# Patient Record
Sex: MALE | Race: WHITE
[De-identification: names, ages, dates, MRNs, and addresses within clinical notes are randomized per-mention and may not be internally consistent; named-entity substitution may affect disease eponyms.]

---

## 2019-06-27 ENCOUNTER — HOSPITAL ENCOUNTER (OUTPATIENT)
Dept: HOSPITAL 47 - EC | Age: 19
Setting detail: OBSERVATION
LOS: 2 days | Discharge: HOME | End: 2019-06-29
Payer: COMMERCIAL

## 2019-06-27 DIAGNOSIS — S22.079A: ICD-10-CM

## 2019-06-27 DIAGNOSIS — S22.42XA: ICD-10-CM
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
## 2019-06-27 DIAGNOSIS — M79.652: ICD-10-CM

## 2019-06-27 DIAGNOSIS — S22.069A: Primary | ICD-10-CM

## 2019-06-27 DIAGNOSIS — R53.81: ICD-10-CM

## 2019-06-27 DIAGNOSIS — S27.321A: ICD-10-CM
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
## 2019-06-27 DIAGNOSIS — M25.512: ICD-10-CM
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
## 2019-06-27 LAB
ALBUMIN SERPL-MCNC: 4.6 G/DL (ref 3.5–5)
ALP SERPL-CCNC: 79 U/L (ref 38–126)
ALT SERPL-CCNC: 37 U/L (ref 21–72)
ANION GAP SERPL CALC-SCNC: 11 MMOL/L
APTT BLD: 22.1 SEC (ref 22–30)
AST SERPL-CCNC: 49 U/L (ref 17–59)
BASOPHILS # BLD AUTO: 0 K/UL (ref 0–0.2)
BASOPHILS NFR BLD AUTO: 0 %
BUN SERPL-SCNC: 24 MG/DL (ref 9–20)
CALCIUM SPEC-MCNC: 9.7 MG/DL (ref 8.4–10.2)
CHLORIDE SERPL-SCNC: 103 MMOL/L (ref 98–107)
CO2 SERPL-SCNC: 26 MMOL/L (ref 22–30)
EOSINOPHIL # BLD AUTO: 0.1 K/UL (ref 0–0.7)
EOSINOPHIL NFR BLD AUTO: 2 %
ERYTHROCYTE [DISTWIDTH] IN BLOOD BY AUTOMATED COUNT: 5.26 M/UL (ref 4.3–5.9)
ERYTHROCYTE [DISTWIDTH] IN BLOOD: 14.1 % (ref 11.5–15.5)
GLUCOSE SERPL-MCNC: 113 MG/DL (ref 74–99)
HCT VFR BLD AUTO: 43.6 % (ref 39–53)
HGB BLD-MCNC: 14.5 GM/DL (ref 13–17.5)
INR PPP: 0.9 (ref ?–1.2)
LYMPHOCYTES # SPEC AUTO: 1.7 K/UL (ref 1–4.8)
LYMPHOCYTES NFR SPEC AUTO: 22 %
MCH RBC QN AUTO: 27.7 PG (ref 25–35)
MCHC RBC AUTO-ENTMCNC: 33.4 G/DL (ref 31–37)
MCV RBC AUTO: 82.9 FL (ref 80–100)
MONOCYTES # BLD AUTO: 0.3 K/UL (ref 0–1)
MONOCYTES NFR BLD AUTO: 4 %
NEUTROPHILS # BLD AUTO: 5.5 K/UL (ref 1.3–7.7)
NEUTROPHILS NFR BLD AUTO: 71 %
PH UR: 6 [PH] (ref 5–8)
PLATELET # BLD AUTO: 240 K/UL (ref 150–450)
POTASSIUM SERPL-SCNC: 4.4 MMOL/L (ref 3.5–5.1)
PROT SERPL-MCNC: 7.4 G/DL (ref 6.3–8.2)
PT BLD: 10.2 SEC (ref 9–12)
SODIUM SERPL-SCNC: 140 MMOL/L (ref 137–145)
SP GR UR: >1.05 (ref 1–1.03)
UROBILINOGEN UR QL STRIP: <2 MG/DL (ref ?–2)
WBC # BLD AUTO: 7.8 K/UL (ref 4–11)

## 2019-06-27 PROCEDURE — 70450 CT HEAD/BRAIN W/O DYE: CPT

## 2019-06-27 PROCEDURE — 84484 ASSAY OF TROPONIN QUANT: CPT

## 2019-06-27 PROCEDURE — 71260 CT THORAX DX C+: CPT

## 2019-06-27 PROCEDURE — 97535 SELF CARE MNGMENT TRAINING: CPT

## 2019-06-27 PROCEDURE — 96374 THER/PROPH/DIAG INJ IV PUSH: CPT

## 2019-06-27 PROCEDURE — 85610 PROTHROMBIN TIME: CPT

## 2019-06-27 PROCEDURE — 86850 RBC ANTIBODY SCREEN: CPT

## 2019-06-27 PROCEDURE — 74177 CT ABD & PELVIS W/CONTRAST: CPT

## 2019-06-27 PROCEDURE — 80053 COMPREHEN METABOLIC PANEL: CPT

## 2019-06-27 PROCEDURE — 96376 TX/PRO/DX INJ SAME DRUG ADON: CPT

## 2019-06-27 PROCEDURE — 72125 CT NECK SPINE W/O DYE: CPT

## 2019-06-27 PROCEDURE — 97116 GAIT TRAINING THERAPY: CPT

## 2019-06-27 PROCEDURE — 72170 X-RAY EXAM OF PELVIS: CPT

## 2019-06-27 PROCEDURE — 71045 X-RAY EXAM CHEST 1 VIEW: CPT

## 2019-06-27 PROCEDURE — 86901 BLOOD TYPING SEROLOGIC RH(D): CPT

## 2019-06-27 PROCEDURE — 36415 COLL VENOUS BLD VENIPUNCTURE: CPT

## 2019-06-27 PROCEDURE — 86900 BLOOD TYPING SEROLOGIC ABO: CPT

## 2019-06-27 PROCEDURE — 97162 PT EVAL MOD COMPLEX 30 MIN: CPT

## 2019-06-27 PROCEDURE — 85025 COMPLETE CBC W/AUTO DIFF WBC: CPT

## 2019-06-27 PROCEDURE — 94762 N-INVAS EAR/PLS OXIMTRY CONT: CPT

## 2019-06-27 PROCEDURE — 85730 THROMBOPLASTIN TIME PARTIAL: CPT

## 2019-06-27 PROCEDURE — 99285 EMERGENCY DEPT VISIT HI MDM: CPT

## 2019-06-27 PROCEDURE — 97166 OT EVAL MOD COMPLEX 45 MIN: CPT

## 2019-06-27 PROCEDURE — 73562 X-RAY EXAM OF KNEE 3: CPT

## 2019-06-27 PROCEDURE — 73030 X-RAY EXAM OF SHOULDER: CPT

## 2019-06-27 PROCEDURE — 96375 TX/PRO/DX INJ NEW DRUG ADDON: CPT

## 2019-06-27 PROCEDURE — 73552 X-RAY EXAM OF FEMUR 2/>: CPT

## 2019-06-27 PROCEDURE — 81003 URINALYSIS AUTO W/O SCOPE: CPT

## 2019-06-27 RX ADMIN — IBUPROFEN PRN MG: 400 TABLET, FILM COATED ORAL at 21:09

## 2019-06-27 NOTE — CT
EXAMINATION TYPE: CT ChestAbdPelvis w con

 

DATE OF EXAM: 6/27/2019

 

COMPARISON: None

 

HISTORY: 18-year-old male with pain after Trauma-8 to 10 foot Fall

 

TECHNIQUE: Contiguous axial scanning of the test, abdomen, and pelvis performed with IV Contrast, pat
ient injected with 100 ml mL of Isovue 300. Delayed images through the kidneys were obtained. Coronal
/sagittal reconstructions performed.

 

CT DLP: 1194.70 mGycm

Automated exposure control for dose reduction was used.

 

FINDINGS: 

Chest:

Heart normal size without pericardial effusion. Mild residual thymic tissue is present in the anterio
r mediastinum.

 

Aorta normal caliber with conventional or 2 subtle branching anatomy. No evidence for aortic dissecti
on.

 

No thoracic lymphadenopathy. Mild bilateral gynecomastia.

 

Scattered mild subpleural pulmonary groundglass is present in the left lung without pleural effusion.
 There is trace punctate foci of pleural air adjacent to the left posterior eighth rib.

 

 

ABDOMEN:

No focal liver lesion or biliary ductal dilatation. Portal venous system is patent. Prominent ingeste
d debris within the stomach.

 

Gallbladder, adrenal glands, kidneys, spleen, and pancreas appear within normal limits.

 

No dilated small bowel, free fluid, or free air.

 

No mesenteric or retroperitoneal lymphadenopathy seen.

 

Mild to moderate stool burden. No pericolonic inflammatory change.

 

 

Pelvis:

Bladder is urine distended. No abnormal fluid collection in the pelvis or pelvic lymphadenopathy.

 

 

Bones:

No hip, pelvic, or sacral fracture is identified. Vertebral body heights are maintained.

 

However, there are fractures of the left lateral seventh and left posterior eighth, ninth, and 10th r
ibs without any displacement. Accompanying fractures involving the left T8, T9, and T10 transverse pr
ocesses.

 

 

 

 

IMPRESSION: 

 

1. NONDISPLACED FRACTURES OF THE LEFT SEVENTH, EIGHTH, NINTH, AND 10TH RIBS WITH ASSOCIATED MILD PULM
ONARY CONTUSIONS ON THE LEFT.

2. A COUPLE PUNCTATE FOCI OF PLEURAL AIR ADJACENT TO THE LEFT POSTERIOR EIGHTH RIB FRACTURE. NO SIZAB
LE PNEUMOTHORAX AT THIS TIME. THE PATIENT CAN BE MONITORED.

3. THERE ARE ACCOMPANYING FRACTURES OF THE LEFT T8, T9, AND T10 TRANSVERSE PROCESSES.

## 2019-06-27 NOTE — XR
EXAMINATION TYPE: XR shoulder complete LT

 

DATE OF EXAM: 6/27/2019

 

COMPARISON: NONE

 

HISTORY: 18-year-old male with pain after fall

 

TECHNIQUE: 3 views

 

FINDINGS: 

AC joint appears congruent and intact. No acute fracture, subluxation, or dislocation.

 

IMPRESSION: 

No acute osseous abnormality seen.

## 2019-06-27 NOTE — P.GSHP
History of Present Illness


H&P Date: 06/27/19





Patient presents status post fall over 8-10 feet off the ship.  He complains 

primarily of left back pain including rib pain.  He has multiple rib fractures 

on the left side.  Ironically, pain is well-controlled.  Recommend orthopedic 

spine consultation for fracture transverse process.  Continue with close 

observation.  Anticipated discharge pending orthopedic assessment.





Past Medical History


Past Medical History: No Reported History


History of Any Multi-Drug Resistant Organisms: None Reported


Past Surgical History: No Surgical Hx Reported


Past Anesthesia/Blood Transfusion Reactions: No Reported Reaction


Past Psychological History: No Psychological Hx Reported


Smoking Status: Never smoker


Past Alcohol Use History: None Reported


Past Drug Use History: None Reported





Medications and Allergies


                                Home Medications











 Medication  Instructions  Recorded  Confirmed  Type


 


No Known Home Medications  06/27/19 06/27/19 History








                                    Allergies











Allergy/AdvReac Type Severity Reaction Status Date / Time


 


No Known Allergies Allergy   Verified 06/27/19 14:01














Surgical - Exam


                                   Vital Signs











Temp Pulse Resp BP Pulse Ox


 


 97.0 F L  89   20   142/82   98 


 


 06/27/19 12:43  06/27/19 12:43  06/27/19 12:43  06/27/19 12:43  06/27/19 12:43














Results





- Labs





                                 06/27/19 12:21





                                 06/27/19 12:21


                  Abnormal Lab Results - Last 24 Hours (Table)











  06/27/19 Range/Units





  12:21 


 


BUN  24 H  (9-20)  mg/dL


 


Glucose  113 H  (74-99)  mg/dL








                                 Diabetes panel











  06/27/19 Range/Units





  12:21 


 


Sodium  140  (137-145)  mmol/L


 


Potassium  4.4  (3.5-5.1)  mmol/L


 


Chloride  103  ()  mmol/L


 


Carbon Dioxide  26  (22-30)  mmol/L


 


BUN  24 H  (9-20)  mg/dL


 


Creatinine  1.06  (0.66-1.25)  mg/dL


 


Glucose  113 H  (74-99)  mg/dL


 


Calcium  9.7  (8.4-10.2)  mg/dL


 


AST  49  (17-59)  U/L


 


ALT  37  (21-72)  U/L


 


Alkaline Phosphatase  79  ()  U/L


 


Total Protein  7.4  (6.3-8.2)  g/dL


 


Albumin  4.6  (3.5-5.0)  g/dL








                                  Calcium panel











  06/27/19 Range/Units





  12:21 


 


Calcium  9.7  (8.4-10.2)  mg/dL


 


Albumin  4.6  (3.5-5.0)  g/dL








                                 Pituitary panel











  06/27/19 Range/Units





  12:21 


 


Sodium  140  (137-145)  mmol/L


 


Potassium  4.4  (3.5-5.1)  mmol/L


 


Chloride  103  ()  mmol/L


 


Carbon Dioxide  26  (22-30)  mmol/L


 


BUN  24 H  (9-20)  mg/dL


 


Creatinine  1.06  (0.66-1.25)  mg/dL


 


Glucose  113 H  (74-99)  mg/dL


 


Calcium  9.7  (8.4-10.2)  mg/dL








                                  Adrenal panel











  06/27/19 Range/Units





  12:21 


 


Sodium  140  (137-145)  mmol/L


 


Potassium  4.4  (3.5-5.1)  mmol/L


 


Chloride  103  ()  mmol/L


 


Carbon Dioxide  26  (22-30)  mmol/L


 


BUN  24 H  (9-20)  mg/dL


 


Creatinine  1.06  (0.66-1.25)  mg/dL


 


Glucose  113 H  (74-99)  mg/dL


 


Calcium  9.7  (8.4-10.2)  mg/dL


 


Total Bilirubin  0.6  (0.2-1.3)  mg/dL


 


AST  49  (17-59)  U/L


 


ALT  37  (21-72)  U/L


 


Alkaline Phosphatase  79  ()  U/L


 


Total Protein  7.4  (6.3-8.2)  g/dL


 


Albumin  4.6  (3.5-5.0)  g/dL

## 2019-06-27 NOTE — ED
General Adult HPI





- General


Stated complaint: Trauma


Time Seen by Provider: 06/27/19 12:43


Source: patient, EMS, RN notes reviewed, old records reviewed


Mode of arrival: EMS


Limitations: no limitations





- History of Present Illness


Initial comments: 





18 -year-old male presenting status post fall.  Patient is supposed member in 

the [employer], he fell into the storage compartment of a [employer] ship.  

Fall was approximately 10 feet.  Patient complains of left shoulder pain, left 

thigh pain.  He was cleared by EMS on backboard, c-collar in place, cervical 

precautions.  He has no head or neck pain.  No loss consciousness.  Denies 

abdominal pain.  He does have some chest pain with deep inspiration.  He has no 

chronic medical conditions.  No medications.





- Related Data


                                Home Medications











 Medication  Instructions  Recorded  Confirmed


 


No Known Home Medications  06/27/19 06/27/19











                                    Allergies











Allergy/AdvReac Type Severity Reaction Status Date / Time


 


No Known Allergies Allergy   Verified 06/27/19 14:01














Review of Systems


ROS Statement: 


Those systems with pertinent positive or pertinent negative responses have been 

documented in the HPI.





ROS Other: All systems not noted in ROS Statement are negative.





Past Medical History


Past Medical History: No Reported History


History of Any Multi-Drug Resistant Organisms: None Reported


Past Surgical History: No Surgical Hx Reported


Smoking Status: Never smoker


Past Alcohol Use History: None Reported


Past Drug Use History: None Reported





General Exam


Limitations: no limitations


General appearance: alert, in no apparent distress


Head exam: Present: atraumatic, normocephalic


Eye exam: Present: normal appearance, PERRL


ENT exam: Present: normal exam


Neck exam: Present: normal inspection, other (Cervical collar in place).  

Absent: tenderness


Respiratory exam: Present: normal lung sounds bilaterally, chest wall tenderness

(Left-sided chest wall tenderness).  Absent: respiratory distress, wheezes


Cardiovascular Exam: Present: regular rate, normal rhythm


GI/Abdominal exam: Present: soft.  Absent: distended, tenderness, guarding, 

rebound


Extremities exam: Present: other (Left distal femur tenderness palpation, 

swelling, no shortening of the left lower extremity, distal pulses are intact.  

Pelvis is stable.)


Back exam: Present: normal inspection.  Absent: tenderness, paraspinal 

tenderness, vertebral tenderness


Neurological exam: Present: alert, oriented X3


Psychiatric exam: Present: normal affect, normal mood


Skin exam: Present: warm, dry, intact.  Absent: cyanosis, diaphoretic





Course


                                   Vital Signs











  06/27/19 06/27/19





  12:43 13:18


 


Temperature 97.0 F L 


 


Pulse Rate 89 76


 


Respiratory 20 20





Rate  


 


Blood Pressure 142/82 141/76


 


O2 Sat by Pulse 98 98





Oximetry  














Medical Decision Making





- Medical Decision Making





18-year-old male, otherwise healthy presenting status post fall with chief 

complaint left shoulder pain, left thigh pain.  Patient fell approximately 10 

feet.  He required EMS extrication.  No head or neck trauma reported.  He was 

transported with spinal precautions, cervical collar in place.  He was evaluated

immediately upon arrival.  He had stable vital signs, his abdomen was soft 

nontender.  Complains predominately of left shoulder pain, posterior rib pain 

and left thigh pain.  He receives CT of the head and cervical spine without 

contrast, and CT with contrast of chest abdomen pelvis as well as plain films of

the chest, pelvis, left knee, and left femur.  Injuries include left posterior 

nondisplaced rib fractures 7 through 10 with adjacent pulmonary contusion.  He 

has transverse process fracture 8-10.  No other traumatic injuries noted on 

imaging.  Case is discussed with Dr. Guerrier, will admit with orthopedics on c

onsult.  Case is discussed with Dania diaz for Dr. Meng, will evaluate 

this patient in consultation regarding transverse process fracture.  Patient 

kept on supplemental oxygen and given incentive spirometry.  Admitted for pain 

control and further evaluation treatment.





- Lab Data


Result diagrams: 


                                 06/27/19 12:21





                                 06/27/19 12:21


                                   Lab Results











  06/27/19 06/27/19 06/27/19 Range/Units





  12:21 12:21 12:21 


 


WBC  7.8    (4.0-11.0)  k/uL


 


RBC  5.26    (4.30-5.90)  m/uL


 


Hgb  14.5    (13.0-17.5)  gm/dL


 


Hct  43.6    (39.0-53.0)  %


 


MCV  82.9    (80.0-100.0)  fL


 


MCH  27.7    (25.0-35.0)  pg


 


MCHC  33.4    (31.0-37.0)  g/dL


 


RDW  14.1    (11.5-15.5)  %


 


Plt Count  240    (150-450)  k/uL


 


Neutrophils %  71    %


 


Lymphocytes %  22    %


 


Monocytes %  4    %


 


Eosinophils %  2    %


 


Basophils %  0    %


 


Neutrophils #  5.5    (1.3-7.7)  k/uL


 


Lymphocytes #  1.7    (1.0-4.8)  k/uL


 


Monocytes #  0.3    (0-1.0)  k/uL


 


Eosinophils #  0.1    (0-0.7)  k/uL


 


Basophils #  0.0    (0-0.2)  k/uL


 


PT    10.2  (9.0-12.0)  sec


 


INR    0.9  (<1.2)  


 


APTT    22.1  (22.0-30.0)  sec


 


Sodium   140   (137-145)  mmol/L


 


Potassium   4.4   (3.5-5.1)  mmol/L


 


Chloride   103   ()  mmol/L


 


Carbon Dioxide   26   (22-30)  mmol/L


 


Anion Gap   11   mmol/L


 


BUN   24 H   (9-20)  mg/dL


 


Creatinine   1.06   (0.66-1.25)  mg/dL


 


Est GFR (CKD-EPI)AfAm   >90   (>60 ml/min/1.73 sqM)  


 


Est GFR (CKD-EPI)NonAf   >90   (>60 ml/min/1.73 sqM)  


 


Glucose   113 H   (74-99)  mg/dL


 


Calcium   9.7   (8.4-10.2)  mg/dL


 


Total Bilirubin   0.6   (0.2-1.3)  mg/dL


 


AST   49   (17-59)  U/L


 


ALT   37   (21-72)  U/L


 


Alkaline Phosphatase   79   ()  U/L


 


Troponin I     (0.000-0.034)  ng/mL


 


Total Protein   7.4   (6.3-8.2)  g/dL


 


Albumin   4.6   (3.5-5.0)  g/dL


 


Blood Type     


 


Blood Type Recheck     


 


Antibody Screen     


 


Spec Expiration Date     














  06/27/19 06/27/19 Range/Units





  12:21 12:21 


 


WBC    (4.0-11.0)  k/uL


 


RBC    (4.30-5.90)  m/uL


 


Hgb    (13.0-17.5)  gm/dL


 


Hct    (39.0-53.0)  %


 


MCV    (80.0-100.0)  fL


 


MCH    (25.0-35.0)  pg


 


MCHC    (31.0-37.0)  g/dL


 


RDW    (11.5-15.5)  %


 


Plt Count    (150-450)  k/uL


 


Neutrophils %    %


 


Lymphocytes %    %


 


Monocytes %    %


 


Eosinophils %    %


 


Basophils %    %


 


Neutrophils #    (1.3-7.7)  k/uL


 


Lymphocytes #    (1.0-4.8)  k/uL


 


Monocytes #    (0-1.0)  k/uL


 


Eosinophils #    (0-0.7)  k/uL


 


Basophils #    (0-0.2)  k/uL


 


PT    (9.0-12.0)  sec


 


INR    (<1.2)  


 


APTT    (22.0-30.0)  sec


 


Sodium    (137-145)  mmol/L


 


Potassium    (3.5-5.1)  mmol/L


 


Chloride    ()  mmol/L


 


Carbon Dioxide    (22-30)  mmol/L


 


Anion Gap    mmol/L


 


BUN    (9-20)  mg/dL


 


Creatinine    (0.66-1.25)  mg/dL


 


Est GFR (CKD-EPI)AfAm    (>60 ml/min/1.73 sqM)  


 


Est GFR (CKD-EPI)NonAf    (>60 ml/min/1.73 sqM)  


 


Glucose    (74-99)  mg/dL


 


Calcium    (8.4-10.2)  mg/dL


 


Total Bilirubin    (0.2-1.3)  mg/dL


 


AST    (17-59)  U/L


 


ALT    (21-72)  U/L


 


Alkaline Phosphatase    ()  U/L


 


Troponin I  <0.012   (0.000-0.034)  ng/mL


 


Total Protein    (6.3-8.2)  g/dL


 


Albumin    (3.5-5.0)  g/dL


 


Blood Type   O Positive  


 


Blood Type Recheck   CABO Indicated  


 


Antibody Screen   NEGATIVE  


 


Spec Expiration Date   06/30/2019 - 2321  














Disposition


Clinical Impression: 


 Fall, Ribs, multiple fractures, Multiple transverse process fractures





Disposition: ADMITTED AS IP TO THIS [facility]


Condition: Stable


Is patient prescribed a controlled substance at d/c from ED?: No


Referrals: 


None,Stated [Primary Care Provider] - 1-2 days


Decision to Admit Reason: Admit from EC


Decision Date: 06/27/19


Decision Time: 15:24

## 2019-06-27 NOTE — XR
EXAMINATION TYPE: XR femur LT 2 views, XR knee complete LT 3 views

 

DATE OF EXAM: 6/27/2019

 

COMPARISON: NONE

 

HISTORY: 18-year-old male with pain after fall

 

 

FINDINGS: 

 

Left femur:

Stable bone island intertrochanteric region of the proximal left femur. IV contrast collecting within
 the bladder. No acute fracture is identified.

 

Left knee:

Anterior soft tissue swelling with underlying small knee joint effusion and some reticulations in the
 infrapatellar fat pad. No acute fracture, subluxation, or dislocation seen.

 

 

 

IMPRESSION: 

1. Left femur: No acute osseous abnormality seen.

2. Left knee: Anterior soft tissue swelling with underlying small joint effusion and some swelling in
 the infrapatellar fat pad. No acute osseous abnormality seen. MRI can assess for internal derangemen
t if indicated.

## 2019-06-27 NOTE — XR
EXAMINATION TYPE: XR chest 1V portable

 

DATE OF EXAM: 6/27/2019

 

COMPARISON: NONE

 

HISTORY: Fall with subsequent chest pain

 

TECHNIQUE: Single frontal view of the chest is obtained.

 

FINDINGS:  There is no focal air space opacity, pleural effusion, or pneumothorax seen.  The cardiac 
silhouette size is mildly enlarged.   The osseous structures are intact.

 

IMPRESSION:  No acute process.

## 2019-06-27 NOTE — CT
EXAMINATION TYPE: CT brain claire wo con

 

DATE OF EXAM: 6/27/2019

 

COMPARISON: None

 

HISTORY: 18-year-old male with pain after Trauma.  8-10 foot fall

 

CT DLP: 1283.80 mGycm

Automated exposure control for dose reduction was used.

 

Technique:  Examination of the head was done in axial plane without intravenous contrast. Coronal and
 sagittal reconstructions performed.

 

CT of the cervical spine was obtained in axial plane without intravenous injection of  contrast mater
ial.  Coronal and sagittal reformatted images were obtained from the axial views for evaluation of  f
ractures, spinal alignment and canal.

 

 

FINDINGS:

 

Head:

There is no evidence of  acute intracranial hemorrhage, acute ischemic changes, mass, mass-effect, or
 extra-axial fluid collection.  There is no effacement of cerebral sulci or basal subarachnoid cister
ns.  There is no hydrocephalus.  There is no midline shift.  Gray-white matter distinction is preserv
ed.

 

Paranasal sinuses and mastoid air cells well pneumatized. Orbits and globes are intact. No calvarial 
fracture.

 

 

Cervical spine:

The alignment of the cervical spine is normal on coronal and reformatted images. There is no cranial 
vertebral abnormality. Fracture of the cervical spine is not seen. There is no central spinal canal s
tenosis. 

 

Sagittal and coronal reformatted images confirm above findings.

 

 

COMBINED IMPRESSION:

1. No acute intracranial abnormality seen.

2. No acute fracture or malalignment of cervical spine.

## 2019-06-27 NOTE — XR
AP pelvis

 

HISTORY: Trauma and pain

 

Single frontal view of the pelvis

 

Patient is rotated. There are overlying artifacts. Bone mineralization, joint spaces and alignment ar
e maintained

 

IMPRESSION: No fracture or dislocation.

## 2019-06-28 RX ADMIN — KETOROLAC TROMETHAMINE SCH MG: 30 INJECTION, SOLUTION INTRAMUSCULAR at 23:26

## 2019-06-28 RX ADMIN — IBUPROFEN PRN MG: 400 TABLET, FILM COATED ORAL at 11:27

## 2019-06-28 RX ADMIN — IBUPROFEN PRN MG: 400 TABLET, FILM COATED ORAL at 05:54

## 2019-06-28 RX ADMIN — KETOROLAC TROMETHAMINE SCH MG: 30 INJECTION, SOLUTION INTRAMUSCULAR at 17:12

## 2019-06-28 NOTE — P.PN
<Jenna Solano A - Last Filed: 06/28/19 11:48>





Subjective


Progress Note Date: 06/28/19





CHIEF COMPLAINT: Status post fall





HISTORY OF PRESENT ILLNESS: Patient seen and examined this morning at the 

bedside.  Patient currently rates his pain 5 out of 10 and states it is mostly 

in his chest and left leg. Denies nausea or vomiting. Tolerating regular diet. 

Vital signs stable. 





PHYSICAL EXAM: 


VITAL SIGNS: Currently stable.


GENERAL: Well-developed in no acute distress. 


HEENT:  No sclera icterus. Extraocular movements grossly intact.  Moist buccal 

mucosa. 


Head is atraumatic, normocephalic. Hears conversational speech. No nasal 

drainage.


NECK:  Supple without lymphadenopathy.


CHEST:  Non-labored respirations and equal bilateral excursions. Left chest wall

tenderness. 


CARDIOVASCULAR:  Regular rate with regular rhythm.  Palpable 2+ radial pulses.


ABDOMEN:  Soft.  Nondistended. Nontender.


MUSCULOSKELETAL:  No clubbing, cyanosis or edema.


NEUROLOGIC:  No focal or lateralizing signs.  Cranial nerves II through XII 

grossly intact.


PSYCH:  Appropriate affect.  Alert and oriented to person, place and time.


SKIN: Well perfused.  Good skin turgor. 





ASSESSMENT: 


1.  Trauma, s/p fall from 8 feet


2.  Nondisplaced left rib fractures, 7-10


3.  Pulmonary contusion


4.  T8, T9, T10 transverse process fracture





PLAN: 


1. Continue regular diet as tolerated


2. Pain control


3. Incentive spirometry


4. Patient evaluated by orthopedics this morning. No surgical intervention 

recommended. Patient to follow up outpatient in 2 weeks.


5. Patient worked with PT this morning and after ambulating in the hallways he 

feels he is not ready for discharge home today. Anticipate discharge home 

tomorrow





Nurse practitioner note has been reviewed by physician. Signing provider agrees 

with the documented findings, assessment, and plan of care. 








Objective





- Vital Signs


Vital signs: 


                                   Vital Signs











Temp  97.7 F   06/28/19 07:00


 


Pulse  69   06/28/19 07:00


 


Resp  17   06/28/19 07:16


 


BP  120/68   06/28/19 07:00


 


Pulse Ox  97   06/28/19 07:00








                                 Intake & Output











 06/27/19 06/28/19 06/28/19





 18:59 06:59 18:59


 


Intake Total  250 236


 


Output Total  950 


 


Balance  -700 236


 


Weight 90.718 kg  


 


Intake:   


 


  Oral  250 236


 


Output:   


 


  Urine  950 


 


Other:   


 


  Voiding Method   Urinal


 


  # Voids  1 1














- Labs


CBC & Chem 7: 


                                 06/27/19 12:21





                                 06/27/19 12:21


Labs: 


                  Abnormal Lab Results - Last 24 Hours (Table)











  06/27/19 06/27/19 Range/Units





  12:21 20:30 


 


BUN  24 H   (9-20)  mg/dL


 


Glucose  113 H   (74-99)  mg/dL


 


Ur Specific Gravity   >1.050 H  (1.001-1.035)  


 


Urine Protein   Trace H  (Negative)  














Assessment and Plan


(1) Pulmonary contusion


Current Visit: Yes   Status: Acute   Code(s): S27.329A - CONTUSION OF LUNG, 

UNSPECIFIED, INITIAL ENCOUNTER   SNOMED Code(s): 283558684


   





(2) Fall


Current Visit: Yes   Status: Acute   Code(s): W19.XXXA - UNSPECIFIED FALL, 

INITIAL ENCOUNTER   SNOMED Code(s): 6054920


   





(3) Multiple transverse process fractures


Current Visit: Yes   Status: Acute   Code(s): VSX7090 -    SNOMED Code(s): 

44375801


   





(4) Ribs, multiple fractures


Current Visit: Yes   Status: Acute   Code(s): S22.49XA - MULTIPLE FRACTURES OF 

RIBS, UNSP SIDE, INIT FOR CLOS FX   SNOMED Code(s): 7939798


   





<Mitra Guerrier N - Last Filed: 06/28/19 16:53>





Subjective





Patient seen and evaluated.  Pain has improved.  He has difficulty with 

ambulation with physical therapy.  Continue hospitalization for generalized 

debility status post fall from over 8 feet.





Objective





- Vital Signs


Vital signs: 


                                   Vital Signs











Temp  98.2 F   06/28/19 15:00


 


Pulse  75   06/28/19 15:00


 


Resp  16   06/28/19 15:00


 


BP  123/74   06/28/19 15:00


 


Pulse Ox  98   06/28/19 15:00








                                 Intake & Output











 06/27/19 06/28/19 06/28/19





 18:59 06:59 18:59


 


Intake Total  250 810


 


Output Total  950 


 


Balance  -700 810


 


Weight 90.718 kg  


 


Intake:   


 


  Oral  250 810


 


Output:   


 


  Urine  950 


 


Other:   


 


  Voiding Method   Urinal


 


  # Voids  1 1














- Labs


CBC & Chem 7: 


                                 06/27/19 12:21





                                 06/27/19 12:21


Labs: 


                  Abnormal Lab Results - Last 24 Hours (Table)











  06/27/19 Range/Units





  20:30 


 


Ur Specific Gravity  >1.050 H  (1.001-1.035)  


 


Urine Protein  Trace H  (Negative)

## 2019-06-28 NOTE — P.CNOR
History of Present Illness





- HPI


Consult reason: fracture (Left transverse process fractures at T8, T9, and T10),

other


History of present illness: 





Patient is a pleasant 18-year-old male who is examined at bedside for further 

evaluation for known thoracic transverse process fractures status post fall.  

Patient is a member of the [employer] and fell into a storage container while o

n the [employer] ship.  He fell approximately 10 feet.  He states he fell on 

his left side with most of his weight landing on his left thigh.  Since that 

time he has been experiencing left thigh pain and significant mid to lower 

thoracic back pain most significant on the left.  He presented to the emergency 

department for further evaluation.  He was in admitted to trauma surgery.  He 

denies any lower extremity radiculopathy or specific weakness bilaterally.  He 

feels he has some difficulty with lifting his left lower extremity due to thigh 

pain.  He is remain lying in bed since his admission.  His thoracic back pain is

exacerbated with movements of his spine.  He denies loss of consciousness.  He 

was found to have left-sided rib fractures as well.  He is admitted to trauma 

surgery and is being treated for a pulmonary contusion.





Past Medical History


Past Medical History: No Reported History


History of Any Multi-Drug Resistant Organisms: None Reported


Past Surgical History: No Surgical Hx Reported


Past Anesthesia/Blood Transfusion Reactions: No Reported Reaction


Past Psychological History: No Psychological Hx Reported


Smoking Status: Never smoker


Past Alcohol Use History: None Reported


Past Drug Use History: None Reported





Medications and Allergies


                                Home Medications











 Medication  Instructions  Recorded  Confirmed  Type


 


No Known Home Medications  06/27/19 06/27/19 History








                                    Allergies











Allergy/AdvReac Type Severity Reaction Status Date / Time


 


No Known Allergies Allergy   Verified 06/27/19 14:01














Physical Examination





Physical exam: 


Patient is awake, alert, and oriented 3


Vital signs stable


Good chest excursion with deep inspiration and expiration


Examination of thoracic and lumbar spine reveals skin is intact with no mike

sions, lacerations, or bruises; no erythema, purulence or signs of infection


Pain with palpation along the mid and lower thoracic spine to the left over the 

paraspinals


Dorsiflexion, plantarflexion, and extensor hallucis longus positive sustained 

bilaterally


Lower extremity strength 5/5 bilaterally except for some difficulty with 

performing hip flexion on the left due to pain


Mild pain with palpation over the left anterior thigh


No evidence of erythema, bruising, laceration, or obvious signs of infection 

over the anterior left thigh


Patellar reflex 2+ bilaterally


No lower extremity hyperreflexia bilaterally


Straight leg test negative bilateral lower extremities


No signs or symptoms of DVT; no calf pain


No pain with internal and external rotation of the hips bilaterally


Neurovascularly intact





Results





Pertinent studies:


CT of the chest, abdomen, and pelvis taken on 06/27/2019 reviewed for orthopedic

purposes: Evidence of left lateral seventh rib fracture, posterior rib fractures

at rib 8, 9, and 10 on the left without displacement; T8, T9, and T10 left 

transverse process fractures; no evidence of hip, pelvic, or sacral fractures 

identified; no evidence of vertebral body compression fracture





X-rays of the left femur taken on 06/27/2019: No evidence of acute osseous 

abnormality seen





- Labs


Labs: 


                  Abnormal Lab Results - Last 24 Hours (Table)











  06/27/19 06/27/19 Range/Units





  12:21 20:30 


 


BUN  24 H   (9-20)  mg/dL


 


Glucose  113 H   (74-99)  mg/dL


 


Ur Specific Gravity   >1.050 H  (1.001-1.035)  


 


Urine Protein   Trace H  (Negative)  








                                      H & H











  06/27/19 Range/Units





  12:21 


 


Hgb  14.5  (13.0-17.5)  gm/dL


 


Hct  43.6  (39.0-53.0)  %








                                   Coagulation











  06/27/19 Range/Units





  12:21 


 


INR  0.9  (<1.2)  











Result Diagrams: 


                                 06/27/19 12:21





                                 06/27/19 12:21





Assessment and Plan


Assessment: 





Assessment:


Left-sided transverse process fractures at T8, T9, and T10


Nondisplaced left-sided rib fractures at T7, T8, T9, T10


Status post fall


Pulmonary contusion


(1) Fall


Current Visit: Yes   Status: Acute   Code(s): W19.XXXA - UNSPECIFIED FALL, 

INITIAL ENCOUNTER   SNOMED Code(s): 0895765


   





(2) Multiple transverse process fractures


Current Visit: Yes   Status: Acute   Code(s): CMJ0037 -    SNOMED Code(s): 

60991169


   





(3) Pulmonary contusion


Current Visit: Yes   Status: Acute   Code(s): S27.329A - CONTUSION OF LUNG, 

UNSPECIFIED, INITIAL ENCOUNTER   SNOMED Code(s): 147349102


   





(4) Ribs, multiple fractures


Current Visit: Yes   Status: Acute   Code(s): S22.49XA - MULTIPLE FRACTURES OF 

RIBS, UNSP SIDE, INIT FOR CLOS FX   SNOMED Code(s): 3550145


   


Plan: 





Plan:


1.  Patient has been discussed in detail with Dr. Bigg Meng.  After physical 

examination of the patient, reviewing of imaging, and further discussion with 

the patient, we are currently planning to continue conservative treatment at 

this time.  Patient does not require bracing for his left thoracic transverse 

process fractures and nondisplaced left-sided rib fractures.  We discussed he 

may participate in light activities to tolerance.  He should avoid excessive 

bending, twisting, lifting; no lifting greater than 10 pounds.  He is 

neurologically intact bilateral lower extremities.  He does have some increased 

thigh pain following his fall.  X-rays do not show evidence of femur fracture.  

He does not have any significant bruising or erythema or the left thigh.  He may

weight-bear as tolerated on left lower extremity.  We discussed he'll be cleared

for discharge from an orthopedic spine standpoint.  Patient may follow-up in the

outpatient setting in approximately 1-2 weeks for further evaluation.  If his 

symptoms are not improving at that time, we will again discuss the possibility 

of obtaining a brace.


2.  Patient will continue be seen in exam by trauma surgery; if patient is able 

to improve, the plan for discharge tomorrow


Time with Patient: Greater than 30 (Including obtaining history, physical 

examination, reviewing of imaging, and dictation.)

## 2019-06-29 VITALS
SYSTOLIC BLOOD PRESSURE: 120 MMHG | TEMPERATURE: 97.7 F | HEART RATE: 65 BPM | DIASTOLIC BLOOD PRESSURE: 61 MMHG | RESPIRATION RATE: 15 BRPM

## 2019-06-29 RX ADMIN — KETOROLAC TROMETHAMINE SCH MG: 30 INJECTION, SOLUTION INTRAMUSCULAR at 05:31

## 2019-06-29 RX ADMIN — KETOROLAC TROMETHAMINE SCH MG: 30 INJECTION, SOLUTION INTRAMUSCULAR at 11:55

## 2019-06-29 NOTE — P.PN
Subjective


Progress Note Date: 06/29/19


























CHIEF COMPLAINT: Status post fall over 8 feet





HISTORY OF PRESENT ILLNESS: The patient is a 18-year-old male status post fall 

from ship.  He had physical therapy, occupational therapy, evaluation by 

orthopedics.  Pain has improved compared to yesterday.  He has multiple left-

sided rib fractures including transverse process fracture for which orthopedic 

surgery had seen. He feels better today. He is ambulating more. "My shoulders 

now hurt." He is concerned that he has no one at home to watch him. Rehab was 

offered and declined by him. Currently pending PT/OT final recommendations 

including ortho as patient had debility preventing discharge yesterday.  

Limitations and restrictions pending from Ortho including PT/OT.





ROS: No reports of nausea and vomiting.  No bowel movements.  No fevers or 

chills.  No new chest pain.  No productive sputum





PHYSICAL EXAM: 


VITAL SIGNS: Reviewed


CONSTITUTIONAL:  Well developed and in no acute distress. 


EYES:  Conjuctivae without sclera icterus. Extraocular movements grossly intact.




HEAD, EARS, NOSE, THROAT: Moist buccal mucosa. Head is atraumatic, no

rmocephalic. Hears conversational speech. No nasal drainage.


NECK:  Supple. No thyroidomegaly.


RESPIRATORY:  Non-labored respirations and equal bilateral excursions.


CARDIOVASCULAR:  Palpable 2+ radial pulses.  Regular rate.  Regular rhythm.


ABDOMEN: Soft, nontender, nondistended.


MUSCULOSKELETAL:  No gross deformity of the lower extremities noted.  No 

clubbing.  No cyanosis.


SKIN: Good skin turgor. Well perfused. 


NEUROLOGIC: Cranial nerves I through XII grossly intact.  No focal or 

lateralizing signs. 


PSYCH:  Appropriate affect.  Alert and oriented to person, place and time. 





ASSESSMENT: 


1.  Status post fall over 8 feet


2.  Multiple rib fractures, left side


3.  Transverse process fracture





PLAN: 


1.  Per ortho: Patient should avoid excessive bending, twisting, and lifting; no

lifting greater than 10 pounds. Patient may ambulate to tolerance


2.  Final recommendations obtained per PT OT where patient was cleared for 

discharge











Objective





- Vital Signs


Vital signs: 


                                   Vital Signs











Temp  97.7 F   06/29/19 07:00


 


Pulse  65   06/29/19 07:00


 


Resp  15   06/29/19 07:00


 


BP  120/61   06/29/19 07:00


 


Pulse Ox  98   06/29/19 07:00








                                 Intake & Output











 06/28/19 06/29/19 06/29/19





 18:59 06:59 18:59


 


Intake Total 1282  


 


Balance 1282  


 


Intake:   


 


  Oral 1282  


 


Other:   


 


  Voiding Method Urinal Urinal 


 


  # Voids 1 3 


 


  # Bowel Movements  1 














- Labs


CBC & Chem 7: 


                                 06/27/19 12:21





                                 06/27/19 12:21





Assessment and Plan


(1) Fall


Current Visit: Yes   Status: Acute   Code(s): W19.XXXA - UNSPECIFIED FALL, 

INITIAL ENCOUNTER   SNOMED Code(s): 8327528


   





(2) Multiple transverse process fractures


Current Visit: Yes   Status: Acute   Code(s): [id number] -    SNOMED Code(s): 

01475832


   





(3) Ribs, multiple fractures


Current Visit: Yes   Status: Acute   Code(s): S22.49XA - MULTIPLE FRACTURES OF 

RIBS, UNSP SIDE, INIT FOR CLOS FX   SNOMED Code(s): 8117843

## 2019-06-29 NOTE — P.DS
Providers


Date of admission: 


06/27/19 15:19





Expected date of discharge: 06/29/19


Attending physician: 


Mitra Guerrier





Consults: 





                                        





06/27/19 15:20


Consult Physician Routine 


   Consulting Provider: TIMOTEO Meng


   Consult Reason/Comments: Fall, transverse process fracture


   Do you want consulting provider notified?: Already Contacted











Primary care physician: 


Stated None








- Discharge Diagnosis(es)


(1) Fall


Current Visit: Yes   Status: Acute   





(2) Multiple transverse process fractures


Current Visit: Yes   Status: Acute   





(3) Ribs, multiple fractures


Current Visit: Yes   Status: Acute   


Hospital Course: 














CHIEF COMPLAINT: Status post fall over 8 feet





HISTORY OF PRESENT ILLNESS: The patient is a 18-year-old male status post fall 

from ship.  He presented with multiple rib fractures involving the left side 

including transverse process fractures.  No neurological deficits noted.  

Secondary to the severity of the pain he was admitted.





He had physical therapy, occupational therapy, evaluation by orthopedics.  Pain 

has improved compared to yesterday.  He has multiple left-sided rib fractures 

including transverse process fracture for which orthopedic surgery had seen. He 

feels better today. He is ambulating more. "My shoulders now hurt." He is 

concerned that he has no one at home to watch him. Rehab was offered and 

declined by him. Currently pending PT/OT final recommendations including ortho 

as patient had debility preventing discharge yesterday.  Limitations and 

restrictions pending from Ortho including PT/OT.





ROS: No reports of nausea and vomiting.  No bowel movements.  No fevers or 

chills.  No new chest pain.  No productive sputum





PHYSICAL EXAM: 


VITAL SIGNS: Reviewed


CONSTITUTIONAL:  Well developed and in no acute distress. 


EYES:  Conjuctivae without sclera icterus. Extraocular movements grossly intact.




HEAD, EARS, NOSE, THROAT: Moist buccal mucosa. Head is atraumatic, 

normocephalic. Hears conversational speech. No nasal drainage.


NECK:  Supple. No thyroidomegaly.


RESPIRATORY:  Non-labored respirations and equal bilateral excursions.


CARDIOVASCULAR:  Palpable 2+ radial pulses.  Regular rate.  Regular rhythm.


ABDOMEN: Soft, nontender, nondistended.


MUSCULOSKELETAL:  No gross deformity of the lower extremities noted.  No 

clubbing.  No cyanosis.


SKIN: Good skin turgor. Well perfused. 


NEUROLOGIC: Cranial nerves I through XII grossly intact.  No focal or 

lateralizing signs. 


PSYCH:  Appropriate affect.  Alert and oriented to person, place and time. 








Per ortho:


1.  Patient should avoid excessive bending, twisting, and lifting; no lifting 

greater than 10 pounds


2.  Patient may ambulate to tolerance





Patient seen by PT/OT with recommendations reviewed. Patient able to transfer 

and independent.





May be discharge with Ibuprofen including Flexeril with restrictions as stated 

above. 


Pertinent Studies: 





CT of the abdomen and pelvis chest demonstrating multiple left-sided rib 

fractures including transverse process fractures.  No solid organ injury.





CT head negative for acute bleed or injury.





Multiple extremity films demonstrating no acute fractures


Patient Condition at Discharge: Stable





Plan - Discharge Summary


Discharge Rx Participant: No


New Discharge Prescriptions: 


New


   Ibuprofen [Motrin] 600 mg PO Q8HR PRN #30 tab


     PRN Reason: Pain


   Cyclobenzaprine [Flexeril] 10 mg PO TID #30 tab


Discharge Medication List





Cyclobenzaprine [Flexeril] 10 mg PO TID #30 tab 06/29/19 [Rx]


Ibuprofen [Motrin] 600 mg PO Q8HR PRN #30 tab 06/29/19 [Rx]








Follow up Appointment(s)/Referral(s): 


Syed Berumen PAC [PHYSICIAN ASSISTANT] - 07/16/19 4:00 pm


(Patient may follow-up with Syed Berumen PA-C or Dr. Bigg Meng at [facility] in 1-2 weeks following discharge.


)


Mitra Guerrier MD [STAFF PHYSICIAN] - As Needed


None,Stated [Primary Care Provider] - 1-2 days


Patient Instructions/Handouts:  Rib Fracture (DC)


Activity/Diet/Wound Care/Special Instructions: 


Per ortho:


1.  Patient should avoid excessive bending, twisting, and lifting; no lifting 

greater than 10 pounds


2.  Patient may ambulate to tolerance


Discharge Disposition: HOME SELF-CARE

## 2025-04-09 ENCOUNTER — HOSPITAL ENCOUNTER (EMERGENCY)
Dept: HOSPITAL 47 - EC | Age: 25
Discharge: HOME | End: 2025-04-09
Payer: COMMERCIAL

## 2025-04-09 VITALS — DIASTOLIC BLOOD PRESSURE: 83 MMHG | SYSTOLIC BLOOD PRESSURE: 137 MMHG | HEART RATE: 94 BPM

## 2025-04-09 VITALS — RESPIRATION RATE: 18 BRPM | TEMPERATURE: 98.1 F

## 2025-04-09 DIAGNOSIS — R55: Primary | ICD-10-CM

## 2025-04-09 LAB
ALBUMIN SERPL-MCNC: 3.8 G/DL (ref 3.5–5)
ALP SERPL-CCNC: 44 U/L (ref 38–126)
ALT SERPL-CCNC: 37 U/L (ref 4–49)
ANION GAP SERPL CALC-SCNC: 8 MMOL/L
APTT BLD: 21.8 SEC (ref 22–30)
AST SERPL-CCNC: 32 U/L (ref 17–59)
BASOPHILS # BLD AUTO: 0.04 10*3/UL (ref 0–0.1)
BASOPHILS NFR BLD AUTO: 0.3 %
BUN SERPL-SCNC: 9 MG/DL (ref 9–20)
CALCIUM SPEC-MCNC: 9.4 MG/DL (ref 8.4–10.2)
CHLORIDE SERPL-SCNC: 103 MMOL/L (ref 98–107)
CO2 SERPL-SCNC: 25 MMOL/L (ref 22–30)
EOSINOPHIL # BLD AUTO: 0.08 10*3/UL (ref 0.04–0.35)
EOSINOPHIL NFR BLD AUTO: 0.7 %
ERYTHROCYTE [DISTWIDTH] IN BLOOD: 12.4 % (ref 11.5–14.5)
GLUCOSE SERPL-MCNC: 87 MG/DL (ref 74–99)
HCT VFR BLD AUTO: 49.2 % (ref 39.6–50)
HGB BLD-MCNC: 16.7 G/DL (ref 13–17)
INR PPP: 0.9 (ref ?–1.2)
LYMPHOCYTES # SPEC AUTO: 1.76 10*3/UL (ref 0.9–5)
LYMPHOCYTES NFR SPEC AUTO: 14.8 %
MCH RBC QN AUTO: 26.5 PG (ref 27–32)
MCHC RBC AUTO-ENTMCNC: 33.9 G/DL (ref 32–37)
MCV RBC AUTO: 78.1 FL (ref 80–97)
MONOCYTES # BLD AUTO: 0.63 10*3/UL (ref 0.2–1)
MONOCYTES NFR BLD AUTO: 5.3 %
NEUTROPHILS # BLD AUTO: 9.33 10*3/UL (ref 1.8–7.7)
NEUTROPHILS NFR BLD AUTO: 78.7 %
PLATELET # BLD AUTO: 383 10*3/UL (ref 140–440)
POTASSIUM SERPL-SCNC: 4.4 MMOL/L (ref 3.5–5.1)
PT BLD: 10.2 SEC (ref 10–12.5)
SODIUM SERPL-SCNC: 136 MMOL/L (ref 137–145)
WBC # BLD AUTO: 11.86 10*3/UL (ref 4.5–10)

## 2025-04-09 PROCEDURE — 84484 ASSAY OF TROPONIN QUANT: CPT

## 2025-04-09 PROCEDURE — 93005 ELECTROCARDIOGRAM TRACING: CPT

## 2025-04-09 PROCEDURE — 85730 THROMBOPLASTIN TIME PARTIAL: CPT

## 2025-04-09 PROCEDURE — 71046 X-RAY EXAM CHEST 2 VIEWS: CPT

## 2025-04-09 PROCEDURE — 85025 COMPLETE CBC W/AUTO DIFF WBC: CPT

## 2025-04-09 PROCEDURE — 85610 PROTHROMBIN TIME: CPT

## 2025-04-09 PROCEDURE — 80053 COMPREHEN METABOLIC PANEL: CPT

## 2025-04-09 PROCEDURE — 96360 HYDRATION IV INFUSION INIT: CPT

## 2025-04-09 PROCEDURE — 99284 EMERGENCY DEPT VISIT MOD MDM: CPT

## 2025-04-09 PROCEDURE — 36415 COLL VENOUS BLD VENIPUNCTURE: CPT

## 2025-04-09 RX ADMIN — NICARDIPINE HYDROCHLORIDE STA MLS/HR: 2.5 INJECTION INTRAVENOUS at 20:42
